# Patient Record
Sex: MALE | Race: WHITE | NOT HISPANIC OR LATINO | Employment: FULL TIME | URBAN - METROPOLITAN AREA
[De-identification: names, ages, dates, MRNs, and addresses within clinical notes are randomized per-mention and may not be internally consistent; named-entity substitution may affect disease eponyms.]

---

## 2020-10-20 ENCOUNTER — OFFICE VISIT (OUTPATIENT)
Dept: FAMILY MEDICINE CLINIC | Facility: CLINIC | Age: 40
End: 2020-10-20

## 2020-10-20 VITALS
TEMPERATURE: 98.5 F | HEIGHT: 68 IN | BODY MASS INDEX: 30.34 KG/M2 | WEIGHT: 200.2 LBS | OXYGEN SATURATION: 98 % | DIASTOLIC BLOOD PRESSURE: 77 MMHG | HEART RATE: 87 BPM | SYSTOLIC BLOOD PRESSURE: 126 MMHG

## 2020-10-20 DIAGNOSIS — Z00.00 HEALTH MAINTENANCE EXAMINATION: Primary | ICD-10-CM

## 2020-10-20 DIAGNOSIS — F41.9 CHRONIC ANXIETY: ICD-10-CM

## 2020-10-20 DIAGNOSIS — F17.210 CIGARETTE NICOTINE DEPENDENCE WITHOUT COMPLICATION: ICD-10-CM

## 2020-10-20 PROBLEM — G89.29 CHRONIC LOW BACK PAIN: Status: ACTIVE | Noted: 2020-10-20

## 2020-10-20 PROBLEM — M54.50 CHRONIC LOW BACK PAIN: Status: ACTIVE | Noted: 2020-10-20

## 2020-10-20 PROCEDURE — 99386 PREV VISIT NEW AGE 40-64: CPT | Performed by: FAMILY MEDICINE

## 2020-10-20 RX ORDER — ALPRAZOLAM 1 MG/1
1 TABLET ORAL 2 TIMES DAILY
COMMUNITY
End: 2020-10-20 | Stop reason: SDUPTHER

## 2020-10-20 RX ORDER — ALPRAZOLAM 1 MG/1
1 TABLET ORAL 2 TIMES DAILY
Qty: 60 TABLET | Refills: 2 | Status: SHIPPED | OUTPATIENT
Start: 2020-10-20 | End: 2021-01-07 | Stop reason: SDUPTHER

## 2021-01-06 ENCOUNTER — TELEPHONE (OUTPATIENT)
Dept: FAMILY MEDICINE CLINIC | Facility: CLINIC | Age: 41
End: 2021-01-06

## 2021-01-06 NOTE — TELEPHONE ENCOUNTER
Patient's family member is in a critical care unit and they will need to be out of town for a week or so  He was asking for more of his xanax to take with him  His wife has a virtual visit at 8:00am on 1/6/2021, if you need to discuss this with them?

## 2021-01-07 DIAGNOSIS — F41.9 CHRONIC ANXIETY: ICD-10-CM

## 2021-01-07 RX ORDER — ALPRAZOLAM 1 MG/1
1 TABLET ORAL 2 TIMES DAILY
Qty: 60 TABLET | Refills: 0 | Status: SHIPPED | OUTPATIENT
Start: 2021-01-07 | End: 2021-01-07 | Stop reason: SDUPTHER

## 2021-01-07 RX ORDER — ALPRAZOLAM 1 MG/1
1 TABLET ORAL 2 TIMES DAILY
Qty: 60 TABLET | Refills: 0 | Status: SHIPPED | OUTPATIENT
Start: 2021-01-07 | End: 2021-01-25 | Stop reason: SDUPTHER

## 2021-01-22 ENCOUNTER — TELEPHONE (OUTPATIENT)
Dept: FAMILY MEDICINE CLINIC | Facility: CLINIC | Age: 41
End: 2021-01-22

## 2021-01-22 DIAGNOSIS — F41.9 CHRONIC ANXIETY: ICD-10-CM

## 2021-01-22 RX ORDER — ALPRAZOLAM 1 MG/1
1 TABLET ORAL 2 TIMES DAILY
Qty: 10 TABLET | Refills: 0 | Status: CANCELLED | OUTPATIENT
Start: 2021-01-22

## 2021-01-22 NOTE — TELEPHONE ENCOUNTER
Medication: Alprazolam  Pharmacy: [facility] in [facility]  Last refill: 1/7/2021  Last office visit: 10/20/20  Upcoming office visit: 1/25/21     Pooja Hassan is still in [place] but asking for about 10 pills to hold him over until he returns to [place]  Tabitha Garcia is asking if this medication can be sent out of state? Please call Tabitha Garcia back with the status

## 2021-01-22 NOTE — TELEPHONE ENCOUNTER
Multiple calls to the phone number  There was no answer  Also, unable to find the [facility] in [facility] in [facility]

## 2021-01-25 NOTE — TELEPHONE ENCOUNTER
Refill of Xanax sent to [facility] in [facility]  Stepdaughter remains in a coma but there is some brain activity  Prolonged stay in [facility]